# Patient Record
Sex: FEMALE | URBAN - NONMETROPOLITAN AREA
[De-identification: names, ages, dates, MRNs, and addresses within clinical notes are randomized per-mention and may not be internally consistent; named-entity substitution may affect disease eponyms.]

---

## 2020-09-17 ENCOUNTER — OFFICE VISIT (OUTPATIENT)
Dept: OBGYN | Age: 35
End: 2020-09-17
Payer: COMMERCIAL

## 2020-09-17 ENCOUNTER — HOSPITAL ENCOUNTER (OUTPATIENT)
Age: 35
Setting detail: SPECIMEN
Discharge: HOME | End: 2020-09-17
Payer: COMMERCIAL

## 2020-09-17 VITALS
BODY MASS INDEX: 31.01 KG/M2 | HEIGHT: 63 IN | WEIGHT: 175 LBS | DIASTOLIC BLOOD PRESSURE: 66 MMHG | SYSTOLIC BLOOD PRESSURE: 112 MMHG

## 2020-09-17 PROCEDURE — G0145 SCR C/V CYTO,THINLAYER,RESCR: HCPCS

## 2020-09-17 PROCEDURE — 87070 CULTURE OTHR SPECIMN AEROBIC: CPT

## 2020-09-17 PROCEDURE — 99385 PREV VISIT NEW AGE 18-39: CPT | Performed by: OBSTETRICS & GYNECOLOGY

## 2020-09-17 RX ORDER — PANTOPRAZOLE SODIUM 40 MG/1
TABLET, DELAYED RELEASE ORAL
COMMUNITY
Start: 2020-09-04

## 2020-09-17 SDOH — HEALTH STABILITY: MENTAL HEALTH: HOW OFTEN DO YOU HAVE A DRINK CONTAINING ALCOHOL?: NEVER

## 2020-09-17 NOTE — PROGRESS NOTES
YEARLY PHYSICAL    Date of service: 2020    Kelsie Pereira  Is a 37 y.o.   female    PT's PCP is: No primary care provider on file. : 1985                                             Subjective:       No LMP recorded. Are your menses regular: yes    OB History    Para Term  AB Living   2 2 2     2   SAB TAB Ectopic Molar Multiple Live Births             2      # Outcome Date GA Lbr Don/2nd Weight Sex Delivery Anes PTL Lv   2 Term 09/15/09 37w0d   M CS-LTranv  N LUIS FERNANDO   1 Term 07 38w4d   M CS-LTranv  Y LUIS FERNANDO        Social History     Tobacco Use   Smoking Status Never Smoker   Smokeless Tobacco Never Used        Social History     Substance and Sexual Activity   Alcohol Use Never    Frequency: Never       Family History   Problem Relation Age of Onset    No Known Problems Paternal Grandfather     Breast Cancer Paternal Grandmother     Cancer Maternal Grandmother     No Known Problems Maternal Grandfather     Hypertension Father     No Known Problems Mother        Allergies: Patient has no known allergies. Current Outpatient Medications:     pantoprazole (PROTONIX) 40 MG tablet, TK 1 T PO QD, Disp: , Rfl:     Social History     Substance and Sexual Activity   Sexual Activity Yes    Partners: Male    Birth control/protection: Other-see comments    Comment: Tubal       Any bleeding or pain with intercourse: No    Last Yearly:      Last pap:     Last HPV: unknown    Last Mammogram: never    Last Dexascan never    Do you do self breast exams: Yes    No past medical history on file.     Past Surgical History:   Procedure Laterality Date     SECTION      TUBAL LIGATION      TYMPANOSTOMY TUBE PLACEMENT         Family History   Problem Relation Age of Onset    No Known Problems Paternal Grandfather     Breast Cancer Paternal Grandmother     Cancer Maternal Grandmother     No Known Problems Maternal Grandfather     Hypertension Father     No Known Problems Mother        Any family history of breast or ovarian cancer: Yes, PGM-breast    Any family history of blood clots: No      Chief Complaint   Patient presents with    Gynecologic Exam          Nurse: MELVA BLANK:  Vital Signs  Blood pressure 112/66, height 5' 2.5\" (1.588 m), weight 175 lb (79.4 kg), last menstrual period 08/25/2020. Labs:    No results found for this visit on 09/17/20. HPI: The patient is here for a yearly exam she has no complaints or problems at this time    NoPT denies fever, chills, nausea and vomiting       Objective  Lymphatic:   no lymphadenopathy  Heent:   negative   Cor: regular rate and rhythm, no murmurs              Pul:clear to auscultation bilaterally- no wheezes, rales or rhonchi, normal air movement, no respiratory distress      GI: Abdomen soft, non-tender. BS normal. No masses,  No organomegaly, old well-healed scar on lower abdomen           Extremities: normal strength, tone, and muscle mass   Breasts: Breast:normal appearance, no masses or tenderness, Inspection negative, No nipple retraction or dimpling, No nipple discharge or bleeding, No axillary or supraclavicular adenopathy, Normal to palpation without dominant masses   Pelvic Exam: GENITAL/URINARY:  External Genitalia:  General appearance; normal, Hair distribution; normal, Lesions absent  Vagina:  General appearance normal, Estrogen effect normal, Discharge present, Lesions absent, Pelvic support normal  Cervix:  General appearance normal, Lesions absent, Discharge absent, Tenderness absent, Enlargement absent, Nodularity absent  Uterus:  Size normal, Contour normal, Position normal, Masses absent, Consistency; normal, Support normal, Tenderness absent  Adenexa:   Masses absent, Tenderness absent, Enlargement absent, Nodularity absent                                      Vaginal discharge:  vaginal discharge noted, culture obtained     Uterus: normal size, anteverted, mobile, non-tender, normal shape and consistency     Ovaries: Nonenlarged nontender           Over 50% of time spent on counseling and care coordination on: see assessment and plan                        Assessment and Plan: Routine care        Diagnosis Orders   1. Encounter for annual routine gynecological examination  PAP SMEAR       I am having Darryle Croak. Mason Madrid maintain her pantoprazole.

## 2020-09-20 LAB
CULTURE: NORMAL
Lab: NORMAL
SPECIMEN DESCRIPTION: NORMAL

## 2020-09-30 LAB — CYTOLOGY REPORT: NORMAL

## 2020-10-01 RX ORDER — METRONIDAZOLE 500 MG/1
500 TABLET ORAL 2 TIMES DAILY
Qty: 14 TABLET | Refills: 0 | Status: SHIPPED | OUTPATIENT
Start: 2020-10-01 | End: 2020-10-08